# Patient Record
Sex: FEMALE | Race: WHITE | URBAN - NONMETROPOLITAN AREA
[De-identification: names, ages, dates, MRNs, and addresses within clinical notes are randomized per-mention and may not be internally consistent; named-entity substitution may affect disease eponyms.]

---

## 2021-11-08 ENCOUNTER — LAB OUTSIDE AN ENCOUNTER (OUTPATIENT)
Dept: URBAN - NONMETROPOLITAN AREA CLINIC 4 | Facility: CLINIC | Age: 51
End: 2021-11-08

## 2021-11-08 ENCOUNTER — OFFICE VISIT (OUTPATIENT)
Dept: URBAN - NONMETROPOLITAN AREA CLINIC 4 | Facility: CLINIC | Age: 51
End: 2021-11-08
Payer: COMMERCIAL

## 2021-11-08 ENCOUNTER — WEB ENCOUNTER (OUTPATIENT)
Dept: URBAN - NONMETROPOLITAN AREA CLINIC 4 | Facility: CLINIC | Age: 51
End: 2021-11-08

## 2021-11-08 DIAGNOSIS — K62.5 RECTAL BLEEDING: ICD-10-CM

## 2021-11-08 DIAGNOSIS — D50.8 ACQUIRED IRON DEFICIENCY ANEMIA DUE TO DECREASED ABSORPTION: ICD-10-CM

## 2021-11-08 PROCEDURE — G8427 DOCREV CUR MEDS BY ELIG CLIN: HCPCS | Performed by: INTERNAL MEDICINE

## 2021-11-08 PROCEDURE — 99204 OFFICE O/P NEW MOD 45 MIN: CPT | Performed by: INTERNAL MEDICINE

## 2021-11-08 PROCEDURE — G8418 CALC BMI BLW LOW PARAM F/U: HCPCS | Performed by: INTERNAL MEDICINE

## 2021-11-08 PROCEDURE — 99244 OFF/OP CNSLTJ NEW/EST MOD 40: CPT | Performed by: INTERNAL MEDICINE

## 2021-11-08 PROCEDURE — G9903 PT SCRN TBCO ID AS NON USER: HCPCS | Performed by: INTERNAL MEDICINE

## 2021-11-08 RX ORDER — SODIUM, POTASSIUM,MAG SULFATES 17.5-3.13G
TAKE 325 ML SOLUTION, RECONSTITUTED, ORAL ORAL ONCE
Qty: 1 | Refills: 0 | OUTPATIENT
Start: 2021-11-08 | End: 2021-11-09

## 2021-11-08 NOTE — HPI-TODAY'S VISIT:
11/8/2021 Patient is a 51 year old White female who presents on referral from Dr. Todd Ram MD for evaluation of anemia. A copy of the note will be sent to the referring provider. Patient reports she had labs done with her PCP recently which showed low iron and low hemoglobin of about 11.4, decreased from 14.9 on previous labs. Denies weakness or fatigue. Patient reports iron supplements cause her upset stomach. Patient does take Meloxicam for chronic back pain. No known family history of colon cancer or polyps. Patient denies any heart, lung, kidney conditions. She was on Tamoxifen from [id number] for breast cancer. She also had left sided lumpectomy and radiation, now cleared.

## 2022-01-05 ENCOUNTER — CLAIMS CREATED FROM THE CLAIM WINDOW (OUTPATIENT)
Dept: URBAN - METROPOLITAN AREA CLINIC 4 | Facility: CLINIC | Age: 52
End: 2022-01-05
Payer: COMMERCIAL

## 2022-01-05 ENCOUNTER — OFFICE VISIT (OUTPATIENT)
Dept: URBAN - METROPOLITAN AREA SURGERY CENTER 13 | Facility: SURGERY CENTER | Age: 52
End: 2022-01-05
Payer: COMMERCIAL

## 2022-01-05 DIAGNOSIS — K31.89 DUODENAL ERYTHEMA: ICD-10-CM

## 2022-01-05 DIAGNOSIS — K92.1 ACUTE MELENA: ICD-10-CM

## 2022-01-05 DIAGNOSIS — K29.70 GASTRITIS, UNSPECIFIED, WITHOUT BLEEDING: ICD-10-CM

## 2022-01-05 DIAGNOSIS — D12.8 BENIGN NEOPLASM OF RECTUM: ICD-10-CM

## 2022-01-05 DIAGNOSIS — D12.8 ADENOMATOUS POLYP OF RECTUM: ICD-10-CM

## 2022-01-05 DIAGNOSIS — D50.9 ANEMIA: ICD-10-CM

## 2022-01-05 DIAGNOSIS — K22.89 ESOPHAGEAL BLEEDING: ICD-10-CM

## 2022-01-05 DIAGNOSIS — K31.89 ACQUIRED DEFORMITY OF DUODENUM: ICD-10-CM

## 2022-01-05 PROCEDURE — 43239 EGD BIOPSY SINGLE/MULTIPLE: CPT | Performed by: INTERNAL MEDICINE

## 2022-01-05 PROCEDURE — 88342 IMHCHEM/IMCYTCHM 1ST ANTB: CPT | Performed by: PATHOLOGY

## 2022-01-05 PROCEDURE — 45385 COLONOSCOPY W/LESION REMOVAL: CPT | Performed by: INTERNAL MEDICINE

## 2022-01-05 PROCEDURE — G8907 PT DOC NO EVENTS ON DISCHARG: HCPCS | Performed by: INTERNAL MEDICINE

## 2022-01-05 PROCEDURE — 88305 TISSUE EXAM BY PATHOLOGIST: CPT | Performed by: PATHOLOGY

## 2022-01-05 PROCEDURE — 88312 SPECIAL STAINS GROUP 1: CPT | Performed by: PATHOLOGY

## 2022-01-27 ENCOUNTER — OFFICE VISIT (OUTPATIENT)
Dept: URBAN - NONMETROPOLITAN AREA CLINIC 4 | Facility: CLINIC | Age: 52
End: 2022-01-27
Payer: COMMERCIAL

## 2022-01-27 ENCOUNTER — LAB OUTSIDE AN ENCOUNTER (OUTPATIENT)
Dept: URBAN - NONMETROPOLITAN AREA CLINIC 4 | Facility: CLINIC | Age: 52
End: 2022-01-27

## 2022-01-27 ENCOUNTER — WEB ENCOUNTER (OUTPATIENT)
Dept: URBAN - NONMETROPOLITAN AREA CLINIC 4 | Facility: CLINIC | Age: 52
End: 2022-01-27

## 2022-01-27 DIAGNOSIS — D12.6 TUBULAR ADENOMA OF COLON: ICD-10-CM

## 2022-01-27 DIAGNOSIS — D50.9 IRON DEFICIENCY ANEMIA: ICD-10-CM

## 2022-01-27 DIAGNOSIS — K64.9 HEMORRHOIDS, UNSPECIFIED HEMORRHOID TYPE: ICD-10-CM

## 2022-01-27 DIAGNOSIS — K57.90 DIVERTICULOSIS: ICD-10-CM

## 2022-01-27 DIAGNOSIS — K21.00 GASTROESOPHAGEAL REFLUX DISEASE WITH ESOPHAGITIS WITHOUT HEMORRHAGE: ICD-10-CM

## 2022-01-27 DIAGNOSIS — Z85.3 HISTORY OF BREAST CANCER: ICD-10-CM

## 2022-01-27 PROCEDURE — 99214 OFFICE O/P EST MOD 30 MIN: CPT | Performed by: REGISTERED NURSE

## 2022-01-27 NOTE — HPI-TODAY'S VISIT:
11/8/2021 Patient is a 51 year old White female who presents on referral from Dr. Todd Ram MD for evaluation of anemia. A copy of the note will be sent to the referring provider. Patient reports she had labs done with her PCP recently which showed low iron and low hemoglobin of about 11.4, decreased from 14.9 on previous labs. Denies weakness or fatigue. Patient reports iron supplements cause her upset stomach. Patient does take Meloxicam for chronic back pain. No known family history of colon cancer or polyps. Patient denies any heart, lung, kidney conditions. She was on Tamoxifen from [id number] for breast cancer. She also had left sided lumpectomy and radiation, now cleared.  1/27/22: Pt RTC for f/u. Had EGD/colon 1/5/22: EGD findings: - LA Grade A reflux esophagitis with no bleeding. - Erythematous mucosa in the antrum. Biopsied. - Normal examined duodenum. Biopsied. Bx c/w benign inflammation with no cancerous or infectious changes  Colonoscopy findings: - Hemorrhoids found on perianal exam. - One 5 mm polyp in the rectum, removed with a cold snare. Resected and retrieved. - The examination was otherwise normal. - Diverticulosis in the sigmoid colon and in the descending colon. - The examined portion of the ileum was normal. - The examination was otherwise normal. Bx c/w TA polyp. Rpt in 5 yrs.  Of note, labs from 8/31/21 showed iron 49, ferritin 4, Hgb 11.4 She denies any current GI complaints.

## 2022-01-28 LAB
FERRITIN, SERUM: 10
HEMATOCRIT: 39.9
HEMOGLOBIN: 12.8
IRON BIND.CAP.(TIBC): 368
IRON SATURATION: 17
IRON: 62
MCH: 28.1
MCHC: 32.1
MCV: 88
NRBC: (no result)
PLATELETS: 269
RBC: 4.56
RDW: 12.1
UIBC: 306
WBC: 3.8

## 2022-02-18 ENCOUNTER — OFFICE VISIT (OUTPATIENT)
Dept: URBAN - METROPOLITAN AREA CLINIC 81 | Facility: CLINIC | Age: 52
End: 2022-02-18
Payer: COMMERCIAL

## 2022-02-18 DIAGNOSIS — D50.9 IRON DEFICIENCY ANEMIA: ICD-10-CM

## 2022-02-18 PROCEDURE — 91110 GI TRC IMG INTRAL ESOPH-ILE: CPT | Performed by: INTERNAL MEDICINE

## 2022-02-24 ENCOUNTER — TELEPHONE ENCOUNTER (OUTPATIENT)
Dept: URBAN - METROPOLITAN AREA CLINIC 82 | Facility: CLINIC | Age: 52
End: 2022-02-24

## 2022-03-01 ENCOUNTER — TELEPHONE ENCOUNTER (OUTPATIENT)
Dept: URBAN - NONMETROPOLITAN AREA CLINIC 4 | Facility: CLINIC | Age: 52
End: 2022-03-01

## 2022-03-07 ENCOUNTER — OFFICE VISIT (OUTPATIENT)
Dept: URBAN - NONMETROPOLITAN AREA CLINIC 4 | Facility: CLINIC | Age: 52
End: 2022-03-07
Payer: COMMERCIAL

## 2022-03-07 ENCOUNTER — DASHBOARD ENCOUNTERS (OUTPATIENT)
Age: 52
End: 2022-03-07

## 2022-03-07 DIAGNOSIS — K21.00 GASTROESOPHAGEAL REFLUX DISEASE WITH ESOPHAGITIS WITHOUT HEMORRHAGE: ICD-10-CM

## 2022-03-07 DIAGNOSIS — D50.9 IRON DEFICIENCY ANEMIA: ICD-10-CM

## 2022-03-07 DIAGNOSIS — K62.5 RECTAL BLEEDING: ICD-10-CM

## 2022-03-07 PROBLEM — 429087003: Status: ACTIVE | Noted: 2022-01-27

## 2022-03-07 PROBLEM — 397881000: Status: ACTIVE | Noted: 2022-01-27

## 2022-03-07 PROBLEM — 87522002 IRON DEFICIENCY ANEMIA: Status: ACTIVE | Noted: 2021-12-29

## 2022-03-07 PROBLEM — 266433003: Status: ACTIVE | Noted: 2022-01-27

## 2022-03-07 PROCEDURE — 99214 OFFICE O/P EST MOD 30 MIN: CPT | Performed by: INTERNAL MEDICINE

## 2022-03-07 PROCEDURE — G8427 DOCREV CUR MEDS BY ELIG CLIN: HCPCS | Performed by: INTERNAL MEDICINE

## 2022-03-07 PROCEDURE — G8418 CALC BMI BLW LOW PARAM F/U: HCPCS | Performed by: INTERNAL MEDICINE

## 2022-03-07 PROCEDURE — G9903 PT SCRN TBCO ID AS NON USER: HCPCS | Performed by: INTERNAL MEDICINE

## 2022-03-07 NOTE — HPI-TODAY'S VISIT:
11/8/2021 Patient is a 51 year old White female who presents on referral from Dr. Todd Ram MD for evaluation of anemia. A copy of the note will be sent to the referring provider. Patient reports she had labs done with her PCP recently which showed low iron and low hemoglobin of about 11.4, decreased from 14.9 on previous labs. Denies weakness or fatigue. Patient reports iron supplements cause her upset stomach. Patient does take Meloxicam for chronic back pain. No known family history of colon cancer or polyps. Patient denies any heart, lung, kidney conditions. She was on Tamoxifen from [id number] for breast cancer. She also had left sided lumpectomy and radiation, now cleared.  1/27/22: Pt RTC for f/u. Had EGD/colon 1/5/22: EGD findings: - LA Grade A reflux esophagitis with no bleeding. - Erythematous mucosa in the antrum. Biopsied. - Normal examined duodenum. Biopsied. Bx c/w benign inflammation with no cancerous or infectious changes  Colonoscopy findings: - Hemorrhoids found on perianal exam. - One 5 mm polyp in the rectum, removed with a cold snare. Resected and retrieved. - The examination was otherwise normal. - Diverticulosis in the sigmoid colon and in the descending colon. - The examined portion of the ileum was normal. - The examination was otherwise normal. Bx c/w TA polyp. Rpt in 5 yrs.  Of note, labs from 8/31/21 showed iron 49, ferritin 4, Hgb 11.4 She denies any current GI complaints.   3/7/2022 Patient presents for follow up office visit. Pillcam on 2/18/2022 showed mild gastritis, no bleeding lesions. EGD on 1/5/2022 showed esophagitis and gastritis. Colonoscopy on 1/5/2022 showed polyp in the rectum and diverticulosis. Biopsy of polyp showed tubular adenoma. Biopsy of stomach showed gastritis. Labs on 1/27/2022 showed normal iron saturation and normal hgb. Patient reports her symptoms are about the same since last visit. She denies any active symptoms of heartburn. No known family history of Celiac disease or gluten intolerance.

## 2022-05-04 ENCOUNTER — TELEPHONE ENCOUNTER (OUTPATIENT)
Dept: URBAN - METROPOLITAN AREA CLINIC 92 | Facility: CLINIC | Age: 52
End: 2022-05-04

## 2022-05-04 RX ORDER — PANTOPRAZOLE SODIUM 40 MG/1
1 TABLET TABLET, DELAYED RELEASE ORAL ONCE A DAY
Qty: 90 | Refills: 1 | OUTPATIENT
Start: 2022-05-04

## 2024-04-21 NOTE — PHYSICAL EXAM GASTROINTESTINAL
Abdomen , soft, nontender, nondistended , no guarding or rigidity , no masses palpable , normal bowel sounds , Liver and Spleen , no hepatomegaly present , no hepatosplenomegaly , liver nontender , spleen not palpable
Stable